# Patient Record
Sex: MALE | Employment: OTHER | URBAN - METROPOLITAN AREA
[De-identification: names, ages, dates, MRNs, and addresses within clinical notes are randomized per-mention and may not be internally consistent; named-entity substitution may affect disease eponyms.]

---

## 2017-09-20 ENCOUNTER — HOSPITAL ENCOUNTER (OUTPATIENT)
Facility: CLINIC | Age: 72
Discharge: HOME | End: 2017-09-20
Attending: COLON & RECTAL SURGERY | Admitting: COLON & RECTAL SURGERY
Payer: MEDICARE

## 2017-09-20 ENCOUNTER — SURGERY (OUTPATIENT)
Age: 72
End: 2017-09-20

## 2017-09-20 VITALS
OXYGEN SATURATION: 98 % | SYSTOLIC BLOOD PRESSURE: 117 MMHG | RESPIRATION RATE: 18 BRPM | HEIGHT: 69 IN | DIASTOLIC BLOOD PRESSURE: 74 MMHG | BODY MASS INDEX: 22.22 KG/M2 | HEART RATE: 52 BPM | WEIGHT: 150 LBS

## 2017-09-20 LAB — COLONOSCOPY: NORMAL

## 2017-09-20 PROCEDURE — 45385 COLONOSCOPY W/LESION REMOVAL: CPT | Performed by: COLON & RECTAL SURGERY

## 2017-09-20 PROCEDURE — 88305 TISSUE EXAM BY PATHOLOGIST: CPT | Mod: 26 | Performed by: COLON & RECTAL SURGERY

## 2017-09-20 PROCEDURE — 25000128 H RX IP 250 OP 636: Performed by: COLON & RECTAL SURGERY

## 2017-09-20 PROCEDURE — 88305 TISSUE EXAM BY PATHOLOGIST: CPT | Performed by: COLON & RECTAL SURGERY

## 2017-09-20 PROCEDURE — G0500 MOD SEDAT ENDO SERVICE >5YRS: HCPCS | Performed by: COLON & RECTAL SURGERY

## 2017-09-20 RX ORDER — NALOXONE HYDROCHLORIDE 0.4 MG/ML
.1-.4 INJECTION, SOLUTION INTRAMUSCULAR; INTRAVENOUS; SUBCUTANEOUS
Status: DISCONTINUED | OUTPATIENT
Start: 2017-09-20 | End: 2017-09-20 | Stop reason: HOSPADM

## 2017-09-20 RX ORDER — FLUMAZENIL 0.1 MG/ML
0.2 INJECTION, SOLUTION INTRAVENOUS
Status: DISCONTINUED | OUTPATIENT
Start: 2017-09-20 | End: 2017-09-20 | Stop reason: HOSPADM

## 2017-09-20 RX ORDER — ONDANSETRON 4 MG/1
4 TABLET, ORALLY DISINTEGRATING ORAL EVERY 6 HOURS PRN
Status: DISCONTINUED | OUTPATIENT
Start: 2017-09-20 | End: 2017-09-20 | Stop reason: HOSPADM

## 2017-09-20 RX ORDER — MULTIPLE VITAMINS W/ MINERALS TAB 9MG-400MCG
1 TAB ORAL DAILY
COMMUNITY

## 2017-09-20 RX ORDER — FENTANYL CITRATE 50 UG/ML
INJECTION, SOLUTION INTRAMUSCULAR; INTRAVENOUS PRN
Status: DISCONTINUED | OUTPATIENT
Start: 2017-09-20 | End: 2017-09-20 | Stop reason: HOSPADM

## 2017-09-20 RX ORDER — LIDOCAINE 40 MG/G
CREAM TOPICAL
Status: DISCONTINUED | OUTPATIENT
Start: 2017-09-20 | End: 2017-09-20 | Stop reason: HOSPADM

## 2017-09-20 RX ORDER — ONDANSETRON 2 MG/ML
4 INJECTION INTRAMUSCULAR; INTRAVENOUS EVERY 6 HOURS PRN
Status: DISCONTINUED | OUTPATIENT
Start: 2017-09-20 | End: 2017-09-20 | Stop reason: HOSPADM

## 2017-09-20 RX ORDER — ONDANSETRON 2 MG/ML
4 INJECTION INTRAMUSCULAR; INTRAVENOUS
Status: DISCONTINUED | OUTPATIENT
Start: 2017-09-20 | End: 2017-09-20 | Stop reason: HOSPADM

## 2017-09-20 RX ADMIN — MIDAZOLAM HYDROCHLORIDE 2 MG: 1 INJECTION, SOLUTION INTRAMUSCULAR; INTRAVENOUS at 11:48

## 2017-09-20 RX ADMIN — FENTANYL CITRATE 100 MCG: 50 INJECTION, SOLUTION INTRAMUSCULAR; INTRAVENOUS at 11:48

## 2017-09-20 NOTE — H&P
Pre-Endoscopy History and Physical     Bud Cobian MRN# [id number]   YOB: 1945 Age: 72 year old     Date of Procedure: 9/20/2017  Primary care provider: Yousuf Jo  Type of Endoscopy: colonoscopy  Reason for Procedure: screening  Type of Anesthesia Anticipated: moderate sedation    HPI:    Bud is a 72 year old male who will be undergoing the above procedure.  Patient denies a change in his bowel habits or bleeding. He had a normal colonoscopy 10 years ago. He did have a positive Cologuard last week.    A history and physical has been performed. The patient's medications and allergies have been reviewed. The risks and benefits of the procedure and the sedation options and risks were discussed with the patient.  All questions were answered and informed consent was obtained.      He denies a personal or family history of anesthesia complications or bleeding disorders.   Prior to Admission medications    Medication Sig Start Date End Date Taking? Authorizing Provider   ATORVASTATIN CALCIUM PO    Yes Reported, Patient   ASPIRIN ADULT LOW STRENGTH PO    Yes Reported, Patient   multivitamin, therapeutic with minerals (MULTI-VITAMIN) TABS tablet Take 1 tablet by mouth daily   Yes Reported, Patient   calcium-vitamin D (CALTRATE) 600-400 MG-UNIT per tablet Take 1 tablet by mouth 2 times daily   Yes Reported, Patient       No Known Allergies          Past Medical History:   Diagnosis Date     Hyperlipidemia         Past Surgical History:   Procedure Laterality Date     GENITOURINARY SURGERY  1981    vasectomy     ORTHOPEDIC SURGERY  1978    right knee medial meniscectomy       Social History   Substance Use Topics     Smoking status: Not on file     Smokeless tobacco: Not on file     Alcohol use Not on file       Family History   Problem Relation Age of Onset     Other Cancer Brother    He denies a family history of polyps or colon cancer.    REVIEW OF SYSTEMS:     5 point ROS negative except as  noted above in HPI, including Gen., Resp., CV, GI &  system review.      PHYSICAL EXAM:   There were no vitals taken for this visit. There is no height or weight on file to calculate BMI.   GENERAL APPEARANCE: healthy  MENTAL STATUS: alert  AIRWAY EXAM: Mallampatti Class II (visualization of the soft palate, fauces, and uvula)  RESP: lungs clear to auscultation - no rales, rhonchi or wheezes  CV: regular rates and rhythm      DIAGNOSTICS:    Not indicated      IMPRESSION   ASA Class 2 - Mild systemic disease        PLAN:       Colonoscopy with possible polypectomy, possible biopsy. The indications, procedure and risks were explained to the patient who agrees to proceed.       The above has been forwarded to the consulting provider.      Signed Electronically by: Leydi Pearson  September 20, 2017

## 2017-09-20 NOTE — BRIEF OP NOTE
[facility] Brief Operative Note    Pre-operative diagnosis: screening, 10 year recall   Post-operative diagnosis colon polyps     Procedure: Procedure(s):   - Wound Class: II-Clean Contaminated   Surgeon(s): Surgeon(s) and Role:     * Leydi Pearson MD - Primary   Estimated blood loss: * No values recorded between 9/20/2017 12:00 AM and 9/20/2017 12:15 PM *    Specimens:   ID Type Source Tests Collected by Time Destination   A : cold snare Polyp Large Intestine, Right/Ascending SURGICAL PATHOLOGY EXAM Saira Churchill RN 9/20/2017 12:02 PM    B : polyp # 2 ascending colon; cold snare Polyp Large Intestine, Right/Ascending SURGICAL PATHOLOGY EXAM Saira Churchill RN 9/20/2017 12:04 PM       Findings: See Provation procedure note in Epic

## 2017-09-21 LAB — COPATH REPORT: NORMAL

## 2018-11-20 ENCOUNTER — SURGERY (OUTPATIENT)
Age: 73
End: 2018-11-20

## 2018-11-20 ENCOUNTER — HOSPITAL ENCOUNTER (OUTPATIENT)
Facility: CLINIC | Age: 73
Discharge: HOME | End: 2018-11-20
Attending: COLON & RECTAL SURGERY | Admitting: COLON & RECTAL SURGERY
Payer: MEDICARE

## 2018-11-20 VITALS
OXYGEN SATURATION: 99 % | BODY MASS INDEX: 22.22 KG/M2 | RESPIRATION RATE: 14 BRPM | HEIGHT: 69 IN | SYSTOLIC BLOOD PRESSURE: 125 MMHG | DIASTOLIC BLOOD PRESSURE: 69 MMHG | WEIGHT: 150 LBS

## 2018-11-20 PROBLEM — I73.00 RAYNAUD'S SYNDROME: Status: ACTIVE | Noted: 2018-11-20

## 2018-11-20 PROBLEM — Z82.49 FAMILY HISTORY OF CORONARY ARTERY DISEASE: Status: ACTIVE | Noted: 2017-03-06

## 2018-11-20 PROBLEM — R94.39 ABNORMAL STRESS TEST: Status: ACTIVE | Noted: 2017-03-06

## 2018-11-20 PROBLEM — R07.9 CHEST PAIN: Status: ACTIVE | Noted: 2017-03-06

## 2018-11-20 LAB — COLONOSCOPY: NORMAL

## 2018-11-20 PROCEDURE — G0500 MOD SEDAT ENDO SERVICE >5YRS: HCPCS | Performed by: COLON & RECTAL SURGERY

## 2018-11-20 PROCEDURE — 45385 COLONOSCOPY W/LESION REMOVAL: CPT | Mod: PT | Performed by: COLON & RECTAL SURGERY

## 2018-11-20 PROCEDURE — 25000128 H RX IP 250 OP 636: Performed by: COLON & RECTAL SURGERY

## 2018-11-20 PROCEDURE — 88305 TISSUE EXAM BY PATHOLOGIST: CPT | Mod: 26 | Performed by: COLON & RECTAL SURGERY

## 2018-11-20 PROCEDURE — 88305 TISSUE EXAM BY PATHOLOGIST: CPT | Performed by: COLON & RECTAL SURGERY

## 2018-11-20 RX ORDER — LIDOCAINE 40 MG/G
CREAM TOPICAL
Status: DISCONTINUED | OUTPATIENT
Start: 2018-11-20 | End: 2018-11-20 | Stop reason: HOSPADM

## 2018-11-20 RX ORDER — ONDANSETRON 4 MG/1
4 TABLET, ORALLY DISINTEGRATING ORAL EVERY 6 HOURS PRN
Status: DISCONTINUED | OUTPATIENT
Start: 2018-11-20 | End: 2018-11-20 | Stop reason: HOSPADM

## 2018-11-20 RX ORDER — NALOXONE HYDROCHLORIDE 0.4 MG/ML
.1-.4 INJECTION, SOLUTION INTRAMUSCULAR; INTRAVENOUS; SUBCUTANEOUS
Status: DISCONTINUED | OUTPATIENT
Start: 2018-11-20 | End: 2018-11-20 | Stop reason: HOSPADM

## 2018-11-20 RX ORDER — FENTANYL CITRATE 50 UG/ML
INJECTION, SOLUTION INTRAMUSCULAR; INTRAVENOUS PRN
Status: DISCONTINUED | OUTPATIENT
Start: 2018-11-20 | End: 2018-11-20 | Stop reason: HOSPADM

## 2018-11-20 RX ORDER — ONDANSETRON 2 MG/ML
4 INJECTION INTRAMUSCULAR; INTRAVENOUS
Status: DISCONTINUED | OUTPATIENT
Start: 2018-11-20 | End: 2018-11-20 | Stop reason: HOSPADM

## 2018-11-20 RX ORDER — FLUMAZENIL 0.1 MG/ML
0.2 INJECTION, SOLUTION INTRAVENOUS
Status: DISCONTINUED | OUTPATIENT
Start: 2018-11-20 | End: 2018-11-20 | Stop reason: HOSPADM

## 2018-11-20 RX ORDER — ONDANSETRON 2 MG/ML
4 INJECTION INTRAMUSCULAR; INTRAVENOUS EVERY 6 HOURS PRN
Status: DISCONTINUED | OUTPATIENT
Start: 2018-11-20 | End: 2018-11-20 | Stop reason: HOSPADM

## 2018-11-20 RX ADMIN — MIDAZOLAM 2 MG: 1 INJECTION INTRAMUSCULAR; INTRAVENOUS at 09:15

## 2018-11-20 RX ADMIN — FENTANYL CITRATE 100 MCG: 50 INJECTION, SOLUTION INTRAMUSCULAR; INTRAVENOUS at 09:15

## 2018-11-20 NOTE — BRIEF OP NOTE
[facility] Brief Operative Note    Pre-operative diagnosis: HISTORY OF POLYPS SCREENING   Post-operative diagnosis colon polyp     Procedure: Procedure(s):  COMBINED COLONOSCOPY, REMOVE TUMOR/POLYP/LESION BY SNARE   Surgeon(s): Surgeon(s) and Role:     * Leydi Pearson MD - Primary   Estimated blood loss: * No values recorded between 11/20/2018 12:00 AM and 11/20/2018  9:45 AM *    Specimens:   ID Type Source Tests Collected by Time Destination   A :  Polyp Large Intestine, Right/Ascending SURGICAL PATHOLOGY EXAM Leydi Pearson MD 11/20/2018  9:33 AM       Findings: See Provation procedure note in Epic

## 2018-11-20 NOTE — H&P
Pre-Endoscopy History and Physical     Bud Cobian MRN# [id number]   YOB: 1945 Age: 73 year old     Date of Procedure: 11/20/2018  Primary care provider: Yousuf Jo  Type of Endoscopy: colonoscopy  Reason for Procedure: screening, history of polyps  Type of Anesthesia Anticipated: moderate sedation    HPI:    Bud is a 73 year old male who will be undergoing the above procedure.  Patient denies a change in his bowel habits or bleeding. He had serrated adenomas on last colonoscopy.     A history and physical has been performed. The patient's medications and allergies have been reviewed. The risks and benefits of the procedure and the sedation options and risks were discussed with the patient.  All questions were answered and informed consent was obtained.      He denies a personal or family history of anesthesia complications or bleeding disorders.   Prior to Admission medications    Medication Sig Start Date End Date Taking? Authorizing Provider   ASPIRIN ADULT LOW STRENGTH PO    Yes Reported, Patient   calcium-vitamin D (CALTRATE) 600-400 MG-UNIT per tablet Take 1 tablet by mouth 2 times daily   Yes Reported, Patient   multivitamin, therapeutic with minerals (MULTI-VITAMIN) TABS tablet Take 1 tablet by mouth daily   Yes Reported, Patient   SIMVASTATIN PO    Yes Reported, Patient   ATORVASTATIN CALCIUM PO     Reported, Patient       No Known Allergies     Current Facility-Administered Medications   Medication     lidocaine (LMX4) cream     lidocaine 1 % 1 mL     ondansetron (ZOFRAN) injection 4 mg     sodium chloride (PF) 0.9% PF flush 3 mL     sodium chloride (PF) 0.9% PF flush 3 mL       Patient Active Problem List   Diagnosis     Abnormal stress test     Adhesive capsulitis of shoulder     Adjustment disorder with mixed anxiety and depressed mood     Chest pain     Closed fracture of scapula     Family history of coronary artery disease     Hyperlipidemia     Raynaud's syndrome     "    Past Medical History:   Diagnosis Date     Hyperlipidemia         Past Surgical History:   Procedure Laterality Date     GENITOURINARY SURGERY  1981    vasectomy     ORTHOPEDIC SURGERY  1978    right knee medial meniscectomy       Social History   Substance Use Topics     Smoking status: Never Smoker     Smokeless tobacco: Never Used     Alcohol use Yes      Comment: 3-4 drinks per week       Family History   Problem Relation Age of Onset     Other Cancer Brother    He denies a family history of polyps or colon cancer.    REVIEW OF SYSTEMS:     5 point ROS negative except as noted above in HPI, including Gen., Resp., CV, GI &  system review.      PHYSICAL EXAM:   /74  Ht 1.753 m (5' 9\")  Wt 68 kg (150 lb)  SpO2 99%  BMI 22.15 kg/m2 Estimated body mass index is 22.15 kg/(m^2) as calculated from the following:    Height as of this encounter: 1.753 m (5' 9\").    Weight as of this encounter: 68 kg (150 lb).   GENERAL APPEARANCE: healthy  MENTAL STATUS: alert  AIRWAY EXAM: Mallampatti Class II (visualization of the soft palate, fauces, and uvula)  RESP: lungs clear to auscultation - no rales, rhonchi or wheezes  CV: regular rates and rhythm      DIAGNOSTICS:    Not indicated      IMPRESSION   ASA Class 2 - Mild systemic disease        PLAN:       Colonoscopy with possible polypectomy, possible biopsy. The indications, procedure and risks were explained to the patient who agrees to proceed.       The above has been forwarded to the consulting provider.      Signed Electronically by: Leydi Pearson  November 20, 2018        "

## 2018-11-21 LAB — COPATH REPORT: NORMAL

## 2021-03-04 ENCOUNTER — IMMUNIZATION (OUTPATIENT)
Dept: NURSING | Facility: CLINIC | Age: 76
End: 2021-03-04
Payer: MEDICARE

## 2021-03-04 PROCEDURE — 91300 PR COVID VAC PFIZER DIL RECON 30 MCG/0.3 ML IM: CPT

## 2021-03-04 PROCEDURE — 0001A PR COVID VAC PFIZER DIL RECON 30 MCG/0.3 ML IM: CPT

## 2021-03-14 ENCOUNTER — HEALTH MAINTENANCE LETTER (OUTPATIENT)
Age: 76
End: 2021-03-14

## 2021-03-25 ENCOUNTER — IMMUNIZATION (OUTPATIENT)
Dept: NURSING | Facility: CLINIC | Age: 76
End: 2021-03-25
Attending: INTERNAL MEDICINE
Payer: MEDICARE

## 2021-03-25 PROCEDURE — 91300 PR COVID VAC PFIZER DIL RECON 30 MCG/0.3 ML IM: CPT

## 2021-03-25 PROCEDURE — 0002A PR COVID VAC PFIZER DIL RECON 30 MCG/0.3 ML IM: CPT

## 2021-10-24 ENCOUNTER — HEALTH MAINTENANCE LETTER (OUTPATIENT)
Age: 76
End: 2021-10-24

## 2022-04-10 ENCOUNTER — HEALTH MAINTENANCE LETTER (OUTPATIENT)
Age: 77
End: 2022-04-10

## 2022-10-15 ENCOUNTER — HEALTH MAINTENANCE LETTER (OUTPATIENT)
Age: 77
End: 2022-10-15

## 2023-06-01 ENCOUNTER — HEALTH MAINTENANCE LETTER (OUTPATIENT)
Age: 78
End: 2023-06-01

## 2023-12-18 ENCOUNTER — MEDICAL CORRESPONDENCE (OUTPATIENT)
Dept: HEALTH INFORMATION MANAGEMENT | Facility: CLINIC | Age: 78
End: 2023-12-18
Payer: MEDICARE

## 2023-12-21 ENCOUNTER — TRANSCRIBE ORDERS (OUTPATIENT)
Dept: OTHER | Age: 78
End: 2023-12-21

## 2023-12-21 DIAGNOSIS — Z97.4 HEARING AID WORN: ICD-10-CM

## 2023-12-21 DIAGNOSIS — H91.93 BILATERAL HEARING LOSS, UNSPECIFIED HEARING LOSS TYPE: Primary | ICD-10-CM

## 2024-02-06 NOTE — PROGRESS NOTES
AUDIOLOGY REPORT    SUBJECTIVE: Bud Cobian is a 78 year old male who was seen at the [facility] on 2/09/24 for audiologic evaluation, referred by, Ken Castillo MD. The patient has not been seen previously in this clinic. 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 Roque asks for repetition during conversation and his wife's comments have prompted him to have his hearing tested. He ordered hearing aids online 3 years ago. Over the past year his hearing and word understanding has worsened. He also reports more difficulty hearing in the left ear and experiences unsteadiness every 2 months. Roque uses hearing protection when using power tools. 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 He denies tinnitus, fullness, otalgia, otorrhea, and ear surgery.         OBJECTIVE:  Abuse Screening:  Do you feel unsafe at home or work/school? No  Do you feel threatened by someone? No  Does anyone try to keep you from having contact with others, or doing things outside of your home? No  Physical signs of abuse present? No     Fall Risk Screen:  1. Have you fallen two or more times in the past year? No  2. Have you fallen and had an injury in the past year? No    Timed Up and Go Score (in seconds): Not tested  Is patient a fall risk?   Referral initiated: No  Fall Risk Assessment Completed by Audiology    Otoscopic exam indicates two small bumps near the right TM and a small red bump on the bottom of the left canal. Roque recently had his ears examined by his PCP and there is no concern. Roque notes some blood on the Qtip after cleaning the left ear.      Pure Tone Thresholds assessed using conventional audiometry with good reliability from 250-8000 Hz bilaterally using insert earphones and circumaural headphones.     RIGHT:  Normal sloping to moderately-severe sensorineural hearing loss     LEFT:    Normal sloping to moderately-severe  sensorineural hearing loss     Tympanogram: (tracings lost)    RIGHT: normal eardrum mobility    LEFT:   normal eardrum mobility    Reflexes (reported by stimulus ear): 1000 Hz  RIGHT: Ipsilateral is present at normal levels  RIGHT: Contralateral is present at normal levels  LEFT:   Ipsilateral is present at normal levels  LEFT:   Contralateral is present at normal levels    Speech Reception Threshold:    RIGHT: 15 dB HL    LEFT:   10 dB HL    Word Recognition Score:     RIGHT: 96% at 65 dB HL using NU-6 recorded word list.    LEFT:   88% at 65 dB HL using NU-6 recorded word list.      ASSESSMENT: Today's results reveal normal sloping to moderately-severe sensorineural hearing loss bilaterally. Today s results were discussed with the patient in detail.     PLAN: Roque is a good hearing aid candidate at this time. The patient was counseled regarding hearing loss and impact on communication. The patient is encouraged to contact their insurance to determine hearing aid benefits and inquire if those benefits may be used at [facility]. Please call this clinic with questions regarding these results or recommendations.      Kassidy Abbasi, CCC-A  Clinical Audiologist  [place] #[id number]

## 2024-02-09 ENCOUNTER — OFFICE VISIT (OUTPATIENT)
Dept: AUDIOLOGY | Facility: CLINIC | Age: 79
End: 2024-02-09
Attending: PEDIATRICS
Payer: MEDICARE

## 2024-02-09 DIAGNOSIS — H91.93 BILATERAL HEARING LOSS, UNSPECIFIED HEARING LOSS TYPE: ICD-10-CM

## 2024-02-09 DIAGNOSIS — H90.3 SENSORINEURAL HEARING LOSS (SNHL), BILATERAL: Primary | ICD-10-CM

## 2024-02-09 DIAGNOSIS — Z97.4 HEARING AID WORN: ICD-10-CM

## 2024-02-09 PROCEDURE — 92550 TYMPANOMETRY & REFLEX THRESH: CPT | Performed by: AUDIOLOGIST

## 2024-02-09 PROCEDURE — 92557 COMPREHENSIVE HEARING TEST: CPT | Performed by: AUDIOLOGIST

## 2024-03-13 ENCOUNTER — OFFICE VISIT (OUTPATIENT)
Dept: AUDIOLOGY | Facility: CLINIC | Age: 79
End: 2024-03-13
Payer: MEDICARE

## 2024-03-13 DIAGNOSIS — H90.3 SENSORINEURAL HEARING LOSS (SNHL), BILATERAL: Primary | ICD-10-CM

## 2024-03-13 PROCEDURE — 92591 PR HEARING AID EXAM BINAURAL: CPT | Performed by: AUDIOLOGIST

## 2024-03-13 NOTE — PROGRESS NOTES
AUDIOLOGY REPORT    SUBJECTIVE: Bud Cobian (Perry) is a 78 year old male was seen in the Audiology Clinic at the [facility] [place] [facility] on 3/13/24 to discuss concerns with hearing and functional communication difficulties.  Roque has been seen previously on 2/9/24, and results revealed a normal sloping to moderately-severe sensorineural hearing loss bilaterally.      Bud notes difficulty with communication in a variety of listening situations, particularly when trying to understand speech in background noise. 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       OBJECTIVE: The patient verbally expresses understanding that Medicare does not cover the hearing aid consultation and the patient is responsible for the cost of the appointment.    Patient is a hearing aid candidate and they would like to move forward with a hearing aid evaluation today. Therefore, the patient was presented with different options for amplification to help aid in communication. Styles, levels of technology and monaural vs. binaural fitting were discussed. Realistic expectations of background noise and the adaptation period were discussed. The patient is interested in pursuing advanced-level technology and upgrading to the premium level technology if desired. [employer]'s advanced-level technology offers the ability to adjust time constants which is beneficial for music appreciation.    The hearing aid(s) mutually chosen were:  Binaural: [employer] Nexia 7 CORINNE R  COLOR: Jayagne  BATTERY SIZE: rechargeable  EARMOLD/TIPS: Medium open  CANAL/ LENGTH: 2LP  ACCESSORY: Extra year warranty (free)      ASSESSMENT: Reviewed purchase information and warranty information with patient. The 45 day trial period was explained to patient. The patient was given a copy of the [place] [employer] consumer brochure on  purchasing hearing instruments. Patient risk factors have been provided to the patient in writing prior to the sale of the hearing aid per FDA regulation. The risk factors are also available in the User Instructional Booklet to be presented on the day of the hearing aid fitting. Hearing aid(s) ordered. Hearing aid evaluation completed.      PLAN: The patient is scheduled to return on 5/15/24 for hearing aid fitting and programming. Purchase agreement will be completed on that date.  Please contact this clinic with any questions or concerns.      Kassidy Abbasi, CCC-A  Clinical Audiologist  [place] #[id number]

## 2024-05-15 ENCOUNTER — OFFICE VISIT (OUTPATIENT)
Dept: AUDIOLOGY | Facility: CLINIC | Age: 79
End: 2024-05-15

## 2024-05-15 DIAGNOSIS — H90.3 SENSORINEURAL HEARING LOSS (SNHL), BILATERAL: Primary | ICD-10-CM

## 2024-05-15 PROCEDURE — V5011 HEARING AID FITTING/CHECKING: HCPCS | Performed by: AUDIOLOGIST

## 2024-05-15 PROCEDURE — V5261 HEARING AID, DIGIT, BIN, BTE: HCPCS | Performed by: AUDIOLOGIST

## 2024-05-15 PROCEDURE — V5020 CONFORMITY EVALUATION: HCPCS | Performed by: AUDIOLOGIST

## 2024-05-15 PROCEDURE — V5160 DISPENSING FEE BINAURAL: HCPCS | Performed by: AUDIOLOGIST

## 2024-05-15 NOTE — PROGRESS NOTES
AUDIOLOGY REPORT    SUBJECTIVE: Bud Cobian is a 79 year old male who was seen in the Audiology Clinic at the [facility] on 5/15/24 for a fitting of binaural ReSound Nexia 7 CORINNE R hearing aids. Previous results have revealed normal sloping to moderately severe sensorineural hearing loss bilaterally.       OBJECTIVE: The hearing aid conformity evaluation was completed. The hearing aids were placed and they provided a good fit. Real-ear-probe-microphone measurements were completed in the test box of the [employer] system due to difficulty programming on ear due to the size of the patient's ear canals.  Responses were a good match to NAL-NL1 target with soft sounds audible, moderate sounds comfortable, and loud sounds below discomfort. UCLs are verified through maximum power output measures and demonstrate appropriate limiting of loud inputs. Roque was oriented to proper hearing aid use, care, cleaning (no water, dry brush), batteries (size: BATTERY SIZE: rechargeable, insertion/removal, toxicity, low-battery signal), aid insertion/removal, user booklet, warranty information, storage cases, and other hearing aid details. The patient confirmed understanding of hearing aid use and care, and showed proper insertion of hearing aid and batteries while in the office today. Roque reported good volume and sound quality today.     Hearing aids were programmed as follows:  Program 1: Automatic    Overall gain is set to 100%. Feedback manager was completed.  Tried several open, closed, and tulip domes as the patient reported his voice sounded loud and my voice could be heard leaking out of the left ear canal.  Ultimately ended with tulip domes.    EAR(S) FIT: Bilateral  HEARING AID MODEL NAME: ReSound Nexia 7 CORINNE R  HEARING AID STYLE: -in-the-ear behind-the-ear  EARMOLDS/TIP: 3LP with tulip domes  SERIAL NUMBERS: Right: [id number] Left: [id number]  WARRANTY END DATE:  5/24/2028 (extra year warranty)    The patient signed a waiver per their insurance contract.     The patient's 45-day trial period ends on 6/29/24.       ASSESSMENT: Binaural ReSound Nexia 7 CORINNE R hearing aid(s) were fit today. Verification measures were performed. Bud signed the Hearing Aid Purchase Agreement and was given a copy, as well as details on his hearing aids. Patient was counseled that exact out of pocket amounts cannot be determined for hearing aid claims being sent to insurance. Any insurance coverage information presented to the patient is an estimate only, and is not a guarantee of payment. Patient has been advised to check with their own insurance.      PLAN: Roque will return for follow-up in 2-3 weeks for a hearing aid review appointment at which time cleaning, manual control, and Bluetooth connectivity will be discussed. During their trial period they are encouraged to keep a list of listening situations that can be improved. Those concerns will be addressed at the next appointment. Please call this clinic with questions regarding today s appointment.      Kassidy Abbasi, CCC-A  Clinical Audiologist  [place] #[id number]     Please note that the above dictation was created with voice recognition software and may contain typographic errors.

## 2024-05-29 ENCOUNTER — OFFICE VISIT (OUTPATIENT)
Dept: AUDIOLOGY | Facility: CLINIC | Age: 79
End: 2024-05-29
Payer: MEDICARE

## 2024-05-29 DIAGNOSIS — H90.3 SENSORINEURAL HEARING LOSS (SNHL), BILATERAL: Primary | ICD-10-CM

## 2024-05-29 PROCEDURE — 99207 PR ASSESSMENT FOR HEARING AID: CPT | Performed by: AUDIOLOGIST

## 2024-05-29 NOTE — PROGRESS NOTES
AUDIOLOGY REPORT    SUBJECTIVE: Bud Cobian (Perry) is a 79 year old male who was seen in the Audiology Clinic at the [facility] on 5/29/24 for a follow-up check regarding the fitting of new hearing aids. Previous results have revealed normal sloping to moderately severe sensorineural hearing loss bilaterally.  The patient has been seen previously in this clinic and was fit with binaural ReSound Nexia 7 CORINNE R hearing aids on 5/15/2024.      Today, Roque is unsure if he is inserting the hearing aids properly.  He does not want to stream phone calls through his hearing aids, but would like to stream music.  He also notes that he does not like the sound quality of Bluetooth streaming.  Lastly, his hearing aids are way too loud when he is playing the oboe.       OBJECTIVE: Based on patient report, the following changes were made:     1) the patient is reassured that he is properly inserting the hearing aids.    2) turned off streaming phone calls to his hearing aids under hearing devices.  The patient is able to stream music to his hearing aids.    3) hearing aids are connected to the jeramy and the jeramy is discussed.  While streaming, the patient increased the base and mid frequencies in the music program (in the jeramy) and he is more pleased with the sound quality of streaming.    4) activated volume control (right to raise, left to lower).  In the music program, 250 to 4000 Hz were increased 3 dB to mimic the adjustments he made in the jeramy.  The patient played his elbow and is pleased with the volume well listening to the music program.    5) discussed cleaning; no water or cleaning products.  Patient is able to independently change the wax traps in the office. The patient is encouraged to utilize the hearing aid walk-in/drop-off services as necessary.     Roque reports satisfaction with today's adjustments.       ASSESSMENT: A follow-up appointment for hearing aid fitting  was completed today. No charge for today's services as hearing aids are in warranty.       PLAN: It is recommended that Roque contact me in the next couple of weeks to indicate if he would like to return these devices and exchange them for different  which we can switch out at his next appointment on 6/21/2024.  We will follow-up on background noise at that time as well.  If the patient is pleased and would like to keep these devices, he should return for follow-up as needed, or at least every 9-12 months for cleaning and assessment of hearing aid.   Please call our clinic with questions or concerns.    Kassidy Abbasi, CCC-A  Clinical Audiologist   [place] #[id number]    Please note that the above dictation was created with voice recognition software and may contain typographic errors.

## 2024-06-21 ENCOUNTER — OFFICE VISIT (OUTPATIENT)
Dept: AUDIOLOGY | Facility: CLINIC | Age: 79
End: 2024-06-21

## 2024-06-21 DIAGNOSIS — H90.3 SENSORINEURAL HEARING LOSS (SNHL), BILATERAL: Primary | ICD-10-CM

## 2024-06-21 PROCEDURE — 99207 PR ASSESSMENT FOR HEARING AID: CPT | Performed by: AUDIOLOGIST

## 2024-06-21 NOTE — PROGRESS NOTES
AUDIOLOGY REPORT    SUBJECTIVE: Bud Cobian (Perry) is a 79 year old male who was seen in the Audiology Clinic at the [facility] on 6/21/24 for a follow-up check regarding the fitting of new hearing aids. Previous results have revealed normal sloping to moderately severe sensorineural hearing loss bilaterally.  The patient has been seen previously in this clinic and was fit with binaural ReSound Nexia 7 CORINNE R hearing aids on 5/15/2024.      Today, Roque reports that he is generally very happy with the hearing aids.  He called [employer] who recommended that a music program be created so that he can hear the oboe. He found that if he mutes the hearing aids and he can hear the oboe well and no longer has concerns.  He also notes that he has difficulty inserting the right hearing aid and occasionally notices the indicator sounds are softer in the right ear than the left ear.    OBJECTIVE: We discussed realistic expectations when listening to music with hearing aids.  Listening check revealed the indicator sounds are functioning properly and no intermittency or distortion noted.  There is slight wax inside the dome and the wax trap.  Wax trap and dome replaced.  Found that turning the dome sideways provides a good fit.  The patient is pleased with these hearing aids and would like to keep them.    ASSESSMENT: A follow-up appointment for hearing aid fitting was completed today. No charge for today's services as hearing aids are in warranty.       PLAN: It is recommended that Roque return for follow-up as needed, or at least every 9-12 months for cleaning and assessment of hearing aid.   The patient is encouraged to utilize the hearing aid walk-in/drop-off services as necessary. Please call our clinic with questions or concerns.    Kassidy Abbasi, CCC-A  Clinical Audiologist   [place] #[id number]    Please note that the above dictation was created with voice recognition  software and may contain typographic errors.

## 2024-07-03 ENCOUNTER — OFFICE VISIT (OUTPATIENT)
Dept: AUDIOLOGY | Facility: CLINIC | Age: 79
End: 2024-07-03
Payer: MEDICARE

## 2024-07-03 DIAGNOSIS — H90.3 SENSORINEURAL HEARING LOSS (SNHL), BILATERAL: Primary | ICD-10-CM

## 2024-07-03 PROCEDURE — 99207 PR ASSESSMENT FOR HEARING AID: CPT | Performed by: AUDIOLOGIST

## 2024-07-03 NOTE — PROGRESS NOTES
"AUDIOLOGY REPORT    SUBJECTIVE: Bud Cobian (Perry) is a 79 year old male who was seen in the Audiology Clinic at the [facility] on 7/03/24 for a follow-up check regarding the fitting of new hearing aids. Previous results have revealed normal sloping to moderately severe sensorineural hearing loss bilaterally.  The patient has been seen previously in this clinic and was fit with binaural Re[employer] Nexia 7 CORINNE R hearing aids on 5/15/2024.      Today, Roque is here to create a music program with disabled feedback manager to use while he plays the oboe.    OBJECTIVE: The patient would like to keep the same music program and add an additional \"Oboe\" program that is created with DFS deactivated.  In both programs, the travel sounds were decreased to dB and the base sounds at 250 Hz were increased for dB.  He also requested to decrease the overall gain to 90%.  Practiced answering a phone call using the tap control in the office.      ASSESSMENT: A follow-up appointment for hearing aid fitting was completed today. No charge for today's services as hearing aids are in warranty.       PLAN: It is recommended that Roque return for follow-up as needed, or at least every 9-12 months for cleaning and assessment of hearing aid.   The patient is encouraged to utilize the hearing aid walk-in/drop-off services as necessary. Please call our clinic with questions or concerns.    Kassidy Abbasi, CCC-A  Clinical Audiologist   [place] #[id number]    Please note that the above dictation was created with voice recognition software and may contain typographic errors.  "

## 2024-11-11 ENCOUNTER — DOCUMENTATION ONLY (OUTPATIENT)
Dept: AUDIOLOGY | Facility: CLINIC | Age: 79
End: 2024-11-11
Payer: MEDICARE

## 2024-11-11 NOTE — PROGRESS NOTES
Walk-in hearing aid services on 11/11/24: The patient reported his right hearing aid wasn't working. Examination found significant wax occlusion underneath the filter of the . The wax could not be cleaned out so the  was replaced under warranty today. A listening check subsequently found the hearing aid to be working properly and it was returned to the patient. He is currently scheduled for a hearing aid check on 11/21/24.

## 2024-11-21 ENCOUNTER — OFFICE VISIT (OUTPATIENT)
Dept: AUDIOLOGY | Facility: CLINIC | Age: 79
End: 2024-11-21
Attending: AUDIOLOGIST
Payer: MEDICARE

## 2024-11-21 DIAGNOSIS — H90.3 SENSORINEURAL HEARING LOSS (SNHL), BILATERAL: Primary | ICD-10-CM

## 2024-11-21 NOTE — PROGRESS NOTES
AUDIOLOGY REPORT    SUBJECTIVE: Bud Cobian, 79 year old year old male, was seen at the Audiology Clinic at the [facility] on 11/21/24 for a hearing aid check. Roque's hearing was previously assessed on 02/09/24 and results revealed normal sloping to moderately severe sensorineural hearing loss bilaterally. The patient has been seen previously in this clinic and was fit with binaural ReSound Nexia 7 CORINNE R hearing aids on 5/15/24.    After utilizing the walk-in services, it was recommended that Roque schedule an appointment to discuss cleaning his hearing aids after the right  was replaced due to wax occlusion. Roque reports after his follow-up appointment on 7/3/24 he was not satisfied with the sound quality in his music/oboe program. His friend recommended another audiologist who specializes in musicians with hearing aids. The other audiologist made another music program for when he plays his oboe in which the mid and high frequencies were reduced. When Roque uses the second music program he uses the jeramy to use the sound enhancement and notices improvement. However, when he is in the second music program he notices the people around him sound muffled, so he would like to adjust the second music program in order to hear conversations around him more clearly. He states the second music program is closer to his hearing.    Roque also states his wife and daughter saw much improvement in his participation on family gatherings and conversations. He reports his daughter enjoys talking to him again. However, Roque's wife still has frustrations with him in certain environmental settings and in the house.    Roque mentions when he loses his phone in the house and tries to find it by calling it from a different phone and then the phone calls route to his hearing aids. He would like a way to turn off the phone calls in order to find his phone.    OBJECTIVE: Based on  patient report, the following changes were made:     1) Ears are clear of cerumen. Reviewed how to change the wax traps which he practiced in the office. He is encouraged to change the wax traps about every month.     2) Music 2 (oboe) program: increased 4 dB from 1-4 kHz, increased 2 dB from 1-4k in the moderate level. Patient reports he is satisfied with today's adjustments.     3) Discussed purchasing remote microphone and demonstrated the live voice option on the phone. Patient will utilize the live voice and decide if he would like to purchase the remote microphone after discussing with his wife.    4) Discussed turning off his hearing aids manually or using the  to disconnect the Bluetooth on his hearing aids in order to find his phone in the house.      ASSESSMENT: Hearing aid check completed. Changes to hearing aid was completed as outlined above. No charge visit today (in warranty hearing aid check).      PLAN: It is recommended that Roque return for follow-up as needed, or at least every 6-9 months for cleaning and assessment of hearing aid.   He should contact me if he would like to order a remote microphone. Please call this clinic with any questions regarding today s appointment.      EMMA ChávezA  Audiology Doctoral Extern  [place] #[id number]    I was present with the patient for the entire Audiology appointment, including all procedures/testing performed by the Kassidy student, and agree with the student s assessment and plan as documented.       Kassidy Abbasi, CCC-A  Clinical Audiologist   [place] #[id number]

## 2025-01-06 ENCOUNTER — MEDICAL CORRESPONDENCE (OUTPATIENT)
Dept: HEALTH INFORMATION MANAGEMENT | Facility: CLINIC | Age: 80
End: 2025-01-06

## 2025-01-25 ENCOUNTER — HEALTH MAINTENANCE LETTER (OUTPATIENT)
Age: 80
End: 2025-01-25

## 2025-04-03 ENCOUNTER — TRANSCRIBE ORDERS (OUTPATIENT)
Dept: OTHER | Age: 80
End: 2025-04-03

## 2025-04-03 DIAGNOSIS — Z12.11 SCREENING FOR COLON CANCER: Primary | ICD-10-CM

## 2025-04-03 DIAGNOSIS — K63.5 POLYP OF COLON, UNSPECIFIED PART OF COLON, UNSPECIFIED TYPE: ICD-10-CM

## 2025-04-08 ENCOUNTER — TELEPHONE (OUTPATIENT)
Dept: GASTROENTEROLOGY | Facility: CLINIC | Age: 80
End: 2025-04-08
Payer: MEDICARE

## 2025-04-08 NOTE — TELEPHONE ENCOUNTER
"Endoscopy Scheduling Screen    Have you had any respiratory illness or flu-like symptoms in the last 10 days?  No    What is your communication preference for Instructions and/or Bowel Prep?   MyChart    What insurance is in the chart?  Other:  MEDICARE/[employer]    Ordering/Referring Provider:   LONA TORRES        (If ordering provider performs procedure, schedule with ordering provider unless otherwise instructed. )    BMI: Estimated body mass index is 22.15 kg/m  as calculated from the following:    Height as of 11/20/18: 1.753 m (5' 9\").    Weight as of 11/20/18: 68 kg (150 lb).     Sedation Ordered  moderate sedation.   If patient BMI > 50 do not schedule in [facility].    If patient BMI > 45 do not schedule at [facility].    Are you taking methadone or Suboxone?  NO, No RN review required.    Have you been diagnosed and are being treated for severe PTSD or severe anxiety?  NO, No RN review required.    Are you taking any prescription medications for pain 3 or more times per week?   NO, No RN review required.    Do you have a history of malignant hyperthermia?  No    (Females) Are you currently pregnant?   No     Have you been diagnosed or told you have pulmonary hypertension?   No    Do you have an LVAD?  No    Have you been told you have moderate to severe sleep apnea?  No.    Have you been told you have COPD, asthma, or any other lung disease?  No    Has your doctor ordered any cardiac tests like echo, angiogram, stress test, ablation, or EKG, that you have not completed yet?  No    Do you  have a history of any heart conditions?  No     Have you ever had or are you waiting for an organ transplant?  No. Continue scheduling, no site restrictions.    Have you had a stroke or transient ischemic attack (TIA aka \"mini stroke\") in the last 2 years?   No.    Have you been diagnosed with or been told you have cirrhosis of the liver?   No.    Are you currently on dialysis?   No    Do you need assistance transferring?   No    BMI: " "Estimated body mass index is 22.15 kg/m  as calculated from the following:    Height as of 11/20/18: 1.753 m (5' 9\").    Weight as of 11/20/18: 68 kg (150 lb).     Is patients BMI > 40 and scheduling location [facility]?  No    Do you take an injectable or oral medication for weight loss or diabetes (excluding insulin)?  No    Do you take the medication Naltrexone?  No    Do you take blood thinners?  No       Prep   Are you currently on dialysis or do you have chronic kidney disease?  No    Do you have a diagnosis of diabetes?  No    Do you have a diagnosis of cystic fibrosis (CF)?  No    On a regular basis do you go 3 -5 days between bowel movements?  No    BMI > 40?  No    Preferred Pharmacy:  [id number] [address] [[id number]]     Final Scheduling Details     Procedure scheduled  Colonoscopy    Surgeon:  LUIS     Date of procedure:  06/10/2025     Pre-OP / PAC:   No - Not required for this site.    Location  [facility] - Per order.    Sedation   Moderate Sedation - Per order.      Patient Reminders:   You will receive a call from a Nurse to review instructions and health history.  This assessment must be completed prior to your procedure.  Failure to complete the Nurse assessment may result in the procedure being cancelled.      On the day of your procedure, please designate an adult(s) who can drive you home stay with you for the next 24 hours. The medicines used in the exam will make you sleepy. You will not be able to drive.      You cannot take public transportation, ride share services, or non-medical taxi service without a responsible caregiver.  Medical transport services are allowed with the requirement that a responsible caregiver will receive you at your destination.  We require that drivers and caregivers are confirmed prior to your procedure.  "

## 2025-05-22 ENCOUNTER — TELEPHONE (OUTPATIENT)
Dept: GASTROENTEROLOGY | Facility: CLINIC | Age: 80
End: 2025-05-22
Payer: MEDICARE

## 2025-05-22 NOTE — TELEPHONE ENCOUNTER
Attempted to contact patient in order to complete pre assessment questions.     No answer. Left message to return call to [phone] option 3    Callback communication sent via [employer].      Rula Bush LPN  Endoscopy Procedure Pre Assessment

## 2025-05-22 NOTE — TELEPHONE ENCOUNTER
Pre visit planning completed.      Procedure details:    Patient scheduled for Colonoscopy on 6-10-25.     Arrival time: 0915 (original arrival was 0930 at time of scheduling - ensure patient is aware of updated arrival time).  . Procedure time 1000    Facility location: [facility]; [facility] [place] [address]. Check in location: [facility].     Sedation type: Conscious sedation     Pre op exam needed? No.    Indication for procedure: screening       Chart review:     Electronic implanted devices? No    Recent diagnosis of diverticulitis within the last 6 weeks? No      Medication review:    Diabetic? No    Anticoagulants? No    Weight loss medication/injectable? No GLP-1 medication per patient's medication list. Nursing to verify with pre-assessment call.    Other medication HOLDING recommendations:  N/A      Prep for procedure:     Bowel prep recommendation: Standard Miralax.   Due to: standard bowel prep    Procedure information and instructions sent via ahoyDoc         Cheryle Steele RN  Endoscopy Procedure Pre Assessment   [phone] option 3

## 2025-05-29 ENCOUNTER — TELEPHONE (OUTPATIENT)
Dept: GASTROENTEROLOGY | Facility: CLINIC | Age: 80
End: 2025-05-29
Payer: MEDICARE

## 2025-07-08 ENCOUNTER — HOSPITAL ENCOUNTER (OUTPATIENT)
Facility: CLINIC | Age: 80
Discharge: HOME | End: 2025-07-08
Attending: COLON & RECTAL SURGERY | Admitting: COLON & RECTAL SURGERY
Payer: MEDICARE

## 2025-07-08 VITALS
WEIGHT: 150 LBS | RESPIRATION RATE: 14 BRPM | DIASTOLIC BLOOD PRESSURE: 65 MMHG | OXYGEN SATURATION: 99 % | BODY MASS INDEX: 22.22 KG/M2 | HEART RATE: 48 BPM | SYSTOLIC BLOOD PRESSURE: 128 MMHG | HEIGHT: 69 IN

## 2025-07-08 LAB — COLONOSCOPY: NORMAL

## 2025-07-08 PROCEDURE — 45380 COLONOSCOPY AND BIOPSY: CPT | Performed by: COLON & RECTAL SURGERY

## 2025-07-08 PROCEDURE — G0500 MOD SEDAT ENDO SERVICE >5YRS: HCPCS | Mod: PT | Performed by: COLON & RECTAL SURGERY

## 2025-07-08 PROCEDURE — 45385 COLONOSCOPY W/LESION REMOVAL: CPT | Mod: PT | Performed by: COLON & RECTAL SURGERY

## 2025-07-08 PROCEDURE — 250N000011 HC RX IP 250 OP 636: Performed by: COLON & RECTAL SURGERY

## 2025-07-08 PROCEDURE — 88305 TISSUE EXAM BY PATHOLOGIST: CPT | Mod: TC | Performed by: COLON & RECTAL SURGERY

## 2025-07-08 RX ORDER — ALENDRONATE SODIUM 70 MG/1
TABLET ORAL
COMMUNITY

## 2025-07-08 RX ORDER — ONDANSETRON 2 MG/ML
4 INJECTION INTRAMUSCULAR; INTRAVENOUS EVERY 6 HOURS PRN
Status: CANCELLED | OUTPATIENT
Start: 2025-07-08

## 2025-07-08 RX ORDER — ONDANSETRON 2 MG/ML
4 INJECTION INTRAMUSCULAR; INTRAVENOUS
Status: DISCONTINUED | OUTPATIENT
Start: 2025-07-08 | End: 2025-07-08 | Stop reason: HOSPADM

## 2025-07-08 RX ORDER — NALOXONE HYDROCHLORIDE 0.4 MG/ML
0.4 INJECTION, SOLUTION INTRAMUSCULAR; INTRAVENOUS; SUBCUTANEOUS
Status: CANCELLED | OUTPATIENT
Start: 2025-07-08

## 2025-07-08 RX ORDER — NALOXONE HYDROCHLORIDE 0.4 MG/ML
0.2 INJECTION, SOLUTION INTRAMUSCULAR; INTRAVENOUS; SUBCUTANEOUS
Status: CANCELLED | OUTPATIENT
Start: 2025-07-08

## 2025-07-08 RX ORDER — FLUMAZENIL 0.1 MG/ML
0.2 INJECTION, SOLUTION INTRAVENOUS
Status: CANCELLED | OUTPATIENT
Start: 2025-07-08 | End: 2025-07-08

## 2025-07-08 RX ORDER — ONDANSETRON 4 MG/1
4 TABLET, ORALLY DISINTEGRATING ORAL EVERY 6 HOURS PRN
Status: CANCELLED | OUTPATIENT
Start: 2025-07-08

## 2025-07-08 RX ORDER — LIDOCAINE 40 MG/G
CREAM TOPICAL
Status: DISCONTINUED | OUTPATIENT
Start: 2025-07-08 | End: 2025-07-08 | Stop reason: HOSPADM

## 2025-07-08 RX ORDER — FENTANYL CITRATE 50 UG/ML
INJECTION, SOLUTION INTRAMUSCULAR; INTRAVENOUS PRN
Status: DISCONTINUED | OUTPATIENT
Start: 2025-07-08 | End: 2025-07-08 | Stop reason: HOSPADM

## 2025-07-08 RX ORDER — PROCHLORPERAZINE MALEATE 5 MG/1
5 TABLET ORAL EVERY 6 HOURS PRN
Status: CANCELLED | OUTPATIENT
Start: 2025-07-08

## 2025-07-08 RX ORDER — SODIUM CHLORIDE, SODIUM LACTATE, POTASSIUM CHLORIDE, CALCIUM CHLORIDE 600; 310; 30; 20 MG/100ML; MG/100ML; MG/100ML; MG/100ML
INJECTION, SOLUTION INTRAVENOUS CONTINUOUS
Status: DISCONTINUED | OUTPATIENT
Start: 2025-07-08 | End: 2025-07-08 | Stop reason: HOSPADM

## 2025-07-08 ASSESSMENT — ACTIVITIES OF DAILY LIVING (ADL)
ADLS_ACUITY_SCORE: 41
ADLS_ACUITY_SCORE: 41

## 2025-07-08 NOTE — H&P
Pre-Endoscopy History and Physical     Bud Cobian MRN# [id number]   YOB: 1945 Age: 80 year old     Date of Procedure: 7/8/2025  Primary care provider: Ken Castillo  Type of Endoscopy: colonoscopy  Reason for Procedure: history of polyps  Type of Anesthesia Anticipated: Moderate (conscious) sedation  [id number]}    HPI:    Bud is a 80 year old male who will be undergoing the above procedure.      A history and physical has been performed. The patient's medications and allergies have been reviewed. The risks and benefits of the procedure and the sedation options and risks were discussed with the patient.  All questions were answered and informed consent was obtained.      He denies a personal or family history of anesthesia complications or bleeding disorders.     No Known Allergies     Current Facility-Administered Medications   Medication Dose Route Frequency Provider Last Rate Last Admin    fentaNYL (PF) (SUBLIMAZE) injection   Intravenous PRN Vale Garcia MD   100 mcg at 07/08/25 0817    lactated ringers infusion   Intravenous Continuous Vale Garcia MD        lidocaine (LMX4) cream   Topical Q1H PRN Vale Garcia MD        lidocaine 1 % 0.1-1 mL  0.1-1 mL Other Q1H PRN Vale Garcia MD        midazolam (VERSED) injection   Intravenous PRN Vale Garcia MD   2 mg at 07/08/25 0817    ondansetron (ZOFRAN) injection 4 mg  4 mg Intravenous Once PRN Vale Garcia MD        sodium chloride (PF) 0.9% PF flush 3 mL  3 mL Intracatheter Q8H Vale Garcia MD        sodium chloride (PF) 0.9% PF flush 3 mL  3 mL Intracatheter q1 min prn Vale Garcia MD           Patient Active Problem List   Diagnosis    Abnormal stress test    Adhesive capsulitis of shoulder    Adjustment disorder with mixed anxiety and depressed mood    Chest pain    Closed fracture of scapula    Family history of coronary artery disease    Hyperlipidemia    Raynaud's syndrome     "    Past Medical History:   Diagnosis Date    Hyperlipidemia         Past Surgical History:   Procedure Laterality Date    GENITOURINARY SURGERY  1981    vasectomy    ORTHOPEDIC SURGERY  1978    right knee medial meniscectomy       Social History     Tobacco Use    Smoking status: Never    Smokeless tobacco: Never   Substance Use Topics    Alcohol use: Yes     Comment: 3-4 drinks per week       Family History   Problem Relation Age of Onset    Other Cancer Brother        REVIEW OF SYSTEMS:     5 point ROS negative except as noted above in HPI, including Gen., Resp., CV, GI &  system review.    PHYSICAL EXAM:   /67   Pulse 52   Resp 16   Ht 1.753 m (5' 9\")   Wt 68 kg (150 lb)   SpO2 95%   BMI 22.15 kg/m   Estimated body mass index is 22.15 kg/m  as calculated from the following:    Height as of this encounter: 1.753 m (5' 9\").    Weight as of this encounter: 68 kg (150 lb).   GENERAL APPEARANCE: healthy, alert, and no distress  MENTAL STATUS: alert and oriented x 3  AIRWAY EXAM: Mallampatti Class II (visualization of the soft palate, fauces, and uvula)  RESP: lungs clear to auscultation - no rales, rhonchi or wheezes  CV: regular rates and rhythm and normal S1 S2, no S3 or S4      DIAGNOSTICS:    Not indicated      IMPRESSION   ASA Class 2 - Mild systemic disease        PLAN:       Colonoscopy    The above has been forwarded to the consulting provider.      Signed Electronically by: Vale Garcia MD  July 8, 2025    .         "

## 2025-07-09 LAB
PATH REPORT.COMMENTS IMP SPEC: NORMAL
PATH REPORT.COMMENTS IMP SPEC: NORMAL
PATH REPORT.FINAL DX SPEC: NORMAL
PATH REPORT.GROSS SPEC: NORMAL
PATH REPORT.MICROSCOPIC SPEC OTHER STN: NORMAL
PATH REPORT.RELEVANT HX SPEC: NORMAL
PHOTO IMAGE: NORMAL

## 2025-08-04 ENCOUNTER — OFFICE VISIT (OUTPATIENT)
Dept: AUDIOLOGY | Facility: CLINIC | Age: 80
End: 2025-08-04
Payer: MEDICARE

## 2025-08-04 DIAGNOSIS — H90.3 SENSORINEURAL HEARING LOSS (SNHL), BILATERAL: Primary | ICD-10-CM

## (undated) RX ORDER — FENTANYL CITRATE 50 UG/ML
INJECTION, SOLUTION INTRAMUSCULAR; INTRAVENOUS
Status: DISPENSED
Start: 2017-09-20

## (undated) RX ORDER — FENTANYL CITRATE 50 UG/ML
INJECTION, SOLUTION INTRAMUSCULAR; INTRAVENOUS
Status: DISPENSED
Start: 2018-11-20

## (undated) RX ORDER — FENTANYL CITRATE 50 UG/ML
INJECTION, SOLUTION INTRAMUSCULAR; INTRAVENOUS
Status: DISPENSED
Start: 2025-07-08